# Patient Record
Sex: FEMALE | Race: WHITE | NOT HISPANIC OR LATINO | Employment: UNEMPLOYED | URBAN - METROPOLITAN AREA
[De-identification: names, ages, dates, MRNs, and addresses within clinical notes are randomized per-mention and may not be internally consistent; named-entity substitution may affect disease eponyms.]

---
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
## 2025-01-12 ENCOUNTER — HOSPITAL ENCOUNTER (EMERGENCY)
Facility: CLINIC | Age: 20
Discharge: HOME | End: 2025-01-12
Payer: COMMERCIAL

## 2025-01-12 VITALS
RESPIRATION RATE: 18 BRPM | SYSTOLIC BLOOD PRESSURE: 122 MMHG | OXYGEN SATURATION: 99 % | HEART RATE: 72 BPM | DIASTOLIC BLOOD PRESSURE: 72 MMHG | TEMPERATURE: 98.7 F

## 2025-01-12 DIAGNOSIS — N39.0 UTI (URINARY TRACT INFECTION): ICD-10-CM

## 2025-01-12 LAB
ALBUMIN UR-MCNC: NEGATIVE MG/DL
APPEARANCE UR: ABNORMAL
BACTERIA #/AREA URNS HPF: ABNORMAL /HPF
BILIRUB UR QL STRIP: NEGATIVE
COLOR UR AUTO: ABNORMAL
GLUCOSE UR STRIP-MCNC: NEGATIVE MG/DL
HGB UR QL STRIP: NEGATIVE
KETONES UR STRIP-MCNC: NEGATIVE MG/DL
LEUKOCYTE ESTERASE UR QL STRIP: ABNORMAL
NITRATE UR QL: POSITIVE
PH UR STRIP: 7 [PH] (ref 5–7)
RBC URINE: 4 /HPF
SP GR UR STRIP: 1.01 (ref 1–1.03)
SQUAMOUS EPITHELIAL: 10 /HPF
UROBILINOGEN UR STRIP-MCNC: NORMAL MG/DL
WBC URINE: >182 /HPF

## 2025-01-12 PROCEDURE — 81001 URINALYSIS AUTO W/SCOPE: CPT

## 2025-01-12 PROCEDURE — 87086 URINE CULTURE/COLONY COUNT: CPT

## 2025-01-12 PROCEDURE — 87186 SC STD MICRODIL/AGAR DIL: CPT

## 2025-01-12 PROCEDURE — G0463 HOSPITAL OUTPT CLINIC VISIT: HCPCS

## 2025-01-12 PROCEDURE — 99213 OFFICE O/P EST LOW 20 MIN: CPT

## 2025-01-12 RX ORDER — NITROFURANTOIN 25; 75 MG/1; MG/1
100 CAPSULE ORAL 2 TIMES DAILY
Qty: 10 CAPSULE | Refills: 0 | Status: SHIPPED | OUTPATIENT
Start: 2025-01-12 | End: 2025-01-17

## 2025-01-12 ASSESSMENT — ACTIVITIES OF DAILY LIVING (ADL): ADLS_ACUITY_SCORE: 41

## 2025-01-12 NOTE — ED PROVIDER NOTES
History     Chief Complaint   Patient presents with    Rule out Urinary Tract Infection     HPI  Sujata Todd is a 19 year old female who presents urgent care with chief complaint of UTI symptoms.  Patient reports she developed burning with urination 3 days ago.  Patient denies fever, chills, abdominal pain, back pain, hematuria.    Allergies:  No Known Allergies    Problem List:    There are no active problems to display for this patient.       Past Medical History:    No past medical history on file.    Past Surgical History:    No past surgical history on file.    Family History:    No family history on file.    Social History:  Marital Status:  Single [1]        Medications:    multivitamin w/minerals (MULTIVITAMIN, THERAPEUTIC WITH MINERALS) tablet  nitroFURantoin macrocrystal-monohydrate (MACROBID) 100 MG capsule          Review of Systems   All other systems reviewed and are negative.      Physical Exam   BP: 122/72  Pulse: 72  Temp: 98.7  F (37.1  C)  Resp: 18  SpO2: 99 %      Physical Exam  Vitals and nursing note reviewed.   Constitutional:       General: She is not in acute distress.     Appearance: Normal appearance. She is not ill-appearing or toxic-appearing.   HENT:      Head: Normocephalic.   Cardiovascular:      Rate and Rhythm: Normal rate.      Pulses: Normal pulses.   Pulmonary:      Effort: Pulmonary effort is normal.   Abdominal:      Tenderness: There is no abdominal tenderness. There is no right CVA tenderness or left CVA tenderness.   Neurological:      Mental Status: She is alert.   Psychiatric:         Mood and Affect: Mood normal.         Behavior: Behavior normal.         ED Course        Procedures    Results for orders placed or performed during the hospital encounter of 01/12/25 (from the past 24 hours)   UA Macroscopic with reflex to Microscopic and Culture    Specimen: Urine, Clean Catch   Result Value Ref Range    Color Urine Light Yellow Colorless, Straw, Light Yellow, Yellow     Appearance Urine Slightly Cloudy (A) Clear    Glucose Urine Negative Negative mg/dL    Bilirubin Urine Negative Negative    Ketones Urine Negative Negative mg/dL    Specific Gravity Urine 1.013 1.003 - 1.035    Blood Urine Negative Negative    pH Urine 7.0 5.0 - 7.0    Protein Albumin Urine Negative Negative mg/dL    Urobilinogen Urine Normal Normal, 2.0 mg/dL    Nitrite Urine Positive (A) Negative    Leukocyte Esterase Urine Large (A) Negative    Bacteria Urine Few (A) None Seen /HPF    RBC Urine 4 (H) <=2 /HPF    WBC Urine >182 (H) <=5 /HPF    Squamous Epithelials Urine 10 (H) <=1 /HPF    Narrative    Urine Culture ordered based on laboratory criteria       Medications - No data to display    Assessments & Plan (with Medical Decision Making)     I have reviewed the nursing notes.    I have reviewed the findings, diagnosis, plan and need for follow up with the patient.      Medical Decision Making    19 year old female who presents urgent care with chief complaint of UTI symptoms.  Patient reports she developed burning with urination 3 days ago.  Patient denies fever, chills, abdominal pain, back pain, hematuria.      Urinalysis positive for infection.  No signs or symptoms concerning for pyelonephritis or nephrolithiasis at this time.  Patient will be discharged home stable on bactrim pending urine culture results from today's visit.  She was instructed to follow up with PCP if no improvement in three days.  Worrisome reasons to seek care sooner discussed.      Prior to making a final disposition on this patient the results of patient's tests and other diagnostic studies were discussed with the patient. All questions were answered. Patient expressed understanding of the plan and was amenable to it.       Disclaimer: This note consists of symbols derived from keyboarding, dictation and/or voice recognition software. As a result, there may be errors in the script that have gone undetected. Please consider this  when interpreting information found in this chart.        New Prescriptions    NITROFURANTOIN MACROCRYSTAL-MONOHYDRATE (MACROBID) 100 MG CAPSULE    Take 1 capsule (100 mg) by mouth 2 times daily for 5 days.       Final diagnoses:   UTI (urinary tract infection)       1/12/2025   [facility] EMERGENCY DEPT       Kitty Harris PA-C  01/12/25 1219

## 2025-01-13 LAB — BACTERIA UR CULT: ABNORMAL

## 2025-01-14 ENCOUNTER — TELEPHONE (OUTPATIENT)
Dept: NURSING | Facility: CLINIC | Age: 20
End: 2025-01-14
Payer: COMMERCIAL

## 2025-01-14 NOTE — TELEPHONE ENCOUNTER
[facility]    Reason for call: Lab Result Notification     Lab Result (including Rx patient on, if applicable).  If culture, copy of lab report at bottom.  Lab Result: Urine Culture - See Below    ED Rx: nitroFURantoin macrocrystal-monohydrate (MACROBID) 100 MG capsule - Take 1 capsule (100 mg) by mouth 2 times daily for 5 days. (SUSCEPTIBLE)    Creatinine Level (mg/dl)   Creatinine   Date Value Ref Range Status   11/27/2024 0.86 0.51 - 0.95 mg/dL Final    Creatinine clearance (ml/min), if applicable    Creatinine clearance cannot be calculated (Unknown ideal weight.)     ED Symptoms: Presented to the ED with dysuria for 3 days.  Nitrite positive UA.     Current Symptoms: Symptoms have improved. Denies any new or worsening symptoms.     RN Recommendations/Instructions per [place] ED lab result protocol:   [facility] ED lab result protocol utilized: Urine Culture  Continue antibiotic/medication as prescribed: Nitrofurantoin    Patient/care giver notified to contact your PCP clinic or return to the Emergency department if your:  Symptoms do not resolve after completing antibiotic.  Symptoms worsen or other concerning symptoms.       CARMINA RODRÍGUEZ, BLANCA

## 2025-03-09 ENCOUNTER — HOSPITAL ENCOUNTER (EMERGENCY)
Facility: CLINIC | Age: 20
Discharge: HOME | End: 2025-03-09
Attending: NURSE PRACTITIONER | Admitting: NURSE PRACTITIONER
Payer: COMMERCIAL

## 2025-03-09 VITALS
SYSTOLIC BLOOD PRESSURE: 120 MMHG | RESPIRATION RATE: 18 BRPM | HEART RATE: 94 BPM | TEMPERATURE: 98.9 F | DIASTOLIC BLOOD PRESSURE: 76 MMHG | OXYGEN SATURATION: 98 %

## 2025-03-09 DIAGNOSIS — J06.9 VIRAL URI: ICD-10-CM

## 2025-03-09 PROCEDURE — G0463 HOSPITAL OUTPT CLINIC VISIT: HCPCS

## 2025-03-09 PROCEDURE — 99213 OFFICE O/P EST LOW 20 MIN: CPT | Performed by: NURSE PRACTITIONER

## 2025-03-09 ASSESSMENT — COLUMBIA-SUICIDE SEVERITY RATING SCALE - C-SSRS
6. HAVE YOU EVER DONE ANYTHING, STARTED TO DO ANYTHING, OR PREPARED TO DO ANYTHING TO END YOUR LIFE?: NO
2. HAVE YOU ACTUALLY HAD ANY THOUGHTS OF KILLING YOURSELF IN THE PAST MONTH?: NO
1. IN THE PAST MONTH, HAVE YOU WISHED YOU WERE DEAD OR WISHED YOU COULD GO TO SLEEP AND NOT WAKE UP?: NO

## 2025-03-09 ASSESSMENT — ACTIVITIES OF DAILY LIVING (ADL): ADLS_ACUITY_SCORE: 41

## 2025-03-09 NOTE — DISCHARGE INSTRUCTIONS
Continue ongoing increased oral fluid intake manage fevers or pain with ibuprofen and Tylenol advised that you return if symptoms are not improving despite recommended treatment plan.  It sounds like you had influenza last week and you need return to school documentation.

## 2025-03-09 NOTE — Clinical Note
Perez was seen and treated in our emergency department on 3/9/2025.  She may return to school on 03/10/2025.  Patient was sick with upper respiratory viral illness was off school from Monday, 3/3/2000 25 through 3/ 7/2025.  She may return to school 03/10/2025    If you have any questions or concerns, please don't hesitate to call.      Nichole Churchill, CHARLY CNP

## 2025-03-09 NOTE — ED PROVIDER NOTES
Chief Complaint:   Chief Complaint   Patient presents with    Fever    Misc. Note Transcription         HPI:   Sujata Todd is a 19 year old female who presents to the UC/ED with a 6 day history of fevers starting Monday, 3/3/2025 reports that she stopped having elevated fevers 2 days ago.  She has tried Tylenol and ibuprofen with some improvement of fever symptoms.  She denies diarrhea, nausea, shortness of breath, and vomiting.  She has been exposed to ill contacts.  Patient is concerned that she missed college last week and needs to return to school note for work starting tomorrow.    Problem List:    There are no active problems to display for this patient.       Past Medical History:    No past medical history on file.    Past Surgical History:    No past surgical history on file.      Meds:   Current Outpatient Medications   Medication Sig Dispense Refill    multivitamin w/minerals (MULTIVITAMIN, THERAPEUTIC WITH MINERALS) tablet Take 1 tablet by mouth daily         Allergies:   No Known Allergies    Medications updated and reviewed.  Past, family and surgical history is updated and reviewed in the record.     Review of Systems:  General: see HPI  HEENT: see HPI  Respiratory: see HPI    Physical Exam:   /76   Pulse 94   Temp 98.9  F (37.2  C) (Oral)   Resp 18   SpO2 98%      General: No acute distress on arrival  Head: normocephalic, non-traumatic.  Eyes: Non-reddened conjunctiva, no icterus, noninjected, normal pupillary response to light accommodation bilaterally.  Ears: Left ear: TM intact, middle ear non-erythemic, no purulence, canal non-erythemic, patent. Right ear: TM intact, middle ear non-erythemic without purulence, canal non-erythremic, patent.  Nose: Non-erythemic, no purulence present no edema, patent nostrils.  Mouth/Throat: No oral lesions, moist mucous membranes, Non-erythemic, midline uvula non enlarged tonsils or exudates present.  No cervical adenopathy present..  CV: Regular rate  and rhythm, no cyanosis.  Respiratory: Nonlabored, CTA bilateral throughout.   Abdomen: NT, ND, normal bowel sounds present  Skin: No rashes, lesions, normal color.  Neuro: Normal, active, age-appropriate. Normal response to verbal stimuli. No obvious focal deficits.      Medical Decision Making:  Upper respiratory infection symptoms with Normal vitals.  CXR is not indicated.  Rapid Strep test is not indicated. COVID/RSV/Influenza A and B testing is not    No results found for this or any previous visit (from the past 48 hours).    Assessment:  Viral upper respiratory illness      Plan:   Tylenol, Ibuprofen, and Fluids      Reassurance given regarding diagnosis and recommendations.  Discussed home treatment with antipyretics Prescriptions .  Recommend follow up in primary care as needed, or sooner if symptoms persist. Return to the ED with fever, trouble swallowing or breathing, or any other concerns.         MEDICATIONS GIVEN IN THE EMERGENCY DEPARTMENT:  Medications - No data to display      I have reviewed the nursing notes.  I have reviewed the findings, diagnosis, plan and need for follow up with the patient.        NEW PRESCRIPTIONS STARTED AT TODAY'S ER VISIT  New Prescriptions    No medications on file       Final diagnoses:   Viral URI       3/9/2025   [facility] EMERGENCY DEPT  Condition on disposition: Stable    Recommended supportive cares to aid with symptoms including body aches, fever, coughing, nasal and sinus congestion for 5 days with resolving symptoms.. Advised that if symptoms are not improving despite this treatment plan, then they should follow-up with primary provider for reevaluation. Strict UC/ED return precautions discussed in detail including prolonged fevers, development of shortness of breath or trouble with breathing, weakness or lightheadedness, inability to tolerate oral intake or anything else that is concerning to them. All questions were answered. Pt verbalized  understanding and agreement with the above plan.     Patient verbalized agreement with and understanding of the rational for the diagnosis and treatment plan.  All questions were answered to best of my ability and the patient's satisfaction. The patient was advised to contact or return to their primary clinic or UC/ED with any questions that may arise after this visit.      Disclaimer: This note consists of symbols derived from keyboarding, dictation, and/or voice recognition software. As a result, there may be errors in the script that have gone undetected.  Please consider this when interpreting information found in the chart.        Nichole Churchill, CHARLY CNP  03/09/25 1811

## 2025-08-10 ENCOUNTER — HEALTH MAINTENANCE LETTER (OUTPATIENT)
Age: 20
End: 2025-08-10